# Patient Record
(demographics unavailable — no encounter records)

---

## 2025-04-03 NOTE — HISTORY OF PRESENT ILLNESS
[FreeTextEntry1] : f/u on HTN, HLD [de-identified] : Patient presents for f/u on HTN and HLD. Has been on Amlodipine 2.5mg qd.  He is on Atorvastatin 20mg qd but only takes half.  Notes some leg cramps in the morning. Still snoring. Would like carotid u/s as his father had carotid disease.

## 2025-04-03 NOTE — PLAN
[FreeTextEntry1] : Patient presents for f/u  #HLD C/w Atorvastatin 10mg qd May start CoQ10 for the leg cramps Will check CPK, lipids, and cMP  #HTN #snoring BP at goal C/w Amlodipine 2.5mg qd If swelling increases, will switch to Lisinopril Will order carotid doppler Advised nutritionist, cardiologist, and sleep evaluation referrals